# Patient Record
Sex: FEMALE | Race: WHITE | NOT HISPANIC OR LATINO
[De-identification: names, ages, dates, MRNs, and addresses within clinical notes are randomized per-mention and may not be internally consistent; named-entity substitution may affect disease eponyms.]

---

## 2020-08-28 PROBLEM — Z00.00 ENCOUNTER FOR PREVENTIVE HEALTH EXAMINATION: Status: ACTIVE | Noted: 2020-08-28

## 2020-08-30 ENCOUNTER — TRANSCRIPTION ENCOUNTER (OUTPATIENT)
Age: 25
End: 2020-08-30

## 2020-09-01 ENCOUNTER — TRANSCRIPTION ENCOUNTER (OUTPATIENT)
Age: 25
End: 2020-09-01

## 2020-09-02 ENCOUNTER — OUTPATIENT (OUTPATIENT)
Dept: OUTPATIENT SERVICES | Facility: HOSPITAL | Age: 25
LOS: 1 days | Discharge: HOME | End: 2020-09-02

## 2020-09-21 ENCOUNTER — APPOINTMENT (OUTPATIENT)
Dept: PLASTIC SURGERY | Facility: CLINIC | Age: 25
End: 2020-09-21

## 2020-11-01 ENCOUNTER — TRANSCRIPTION ENCOUNTER (OUTPATIENT)
Age: 25
End: 2020-11-01

## 2020-11-02 ENCOUNTER — APPOINTMENT (OUTPATIENT)
Dept: PLASTIC SURGERY | Facility: CLINIC | Age: 25
End: 2020-11-02
Payer: COMMERCIAL

## 2020-11-02 DIAGNOSIS — Z82.49 FAMILY HISTORY OF ISCHEMIC HEART DISEASE AND OTHER DISEASES OF THE CIRCULATORY SYSTEM: ICD-10-CM

## 2020-11-02 PROCEDURE — 99203 OFFICE O/P NEW LOW 30 MIN: CPT | Mod: 95

## 2020-11-02 NOTE — REASON FOR VISIT
[Home] : at home, [unfilled] , at the time of the visit. [Medical Office: ([facility])___] : at the medical office located in  [Verbal consent obtained from patient] : the patient, [unfilled]

## 2020-11-03 ENCOUNTER — TRANSCRIPTION ENCOUNTER (OUTPATIENT)
Age: 25
End: 2020-11-03

## 2020-11-03 PROBLEM — Z82.49 FAMILY HISTORY OF HYPERTENSION: Status: ACTIVE | Noted: 2020-11-03

## 2020-11-03 RX ORDER — ESTRADIOL 10 UG/1
TABLET, FILM COATED VAGINAL
Refills: 0 | Status: ACTIVE | COMMUNITY

## 2020-11-03 RX ORDER — SPIRONOLACTONE 50 MG/1
TABLET ORAL
Refills: 0 | Status: ACTIVE | COMMUNITY

## 2020-11-03 RX ORDER — CITALOPRAM HYDROBROMIDE 10 MG/1
TABLET, FILM COATED ORAL
Refills: 0 | Status: ACTIVE | COMMUNITY

## 2020-11-04 ENCOUNTER — OUTPATIENT (OUTPATIENT)
Dept: OUTPATIENT SERVICES | Facility: HOSPITAL | Age: 25
LOS: 1 days | Discharge: HOME | End: 2020-11-04

## 2020-12-18 ENCOUNTER — NON-APPOINTMENT (OUTPATIENT)
Age: 25
End: 2020-12-18

## 2021-01-12 ENCOUNTER — APPOINTMENT (OUTPATIENT)
Dept: CT IMAGING | Facility: CLINIC | Age: 26
End: 2021-01-12
Payer: COMMERCIAL

## 2021-01-12 ENCOUNTER — OUTPATIENT (OUTPATIENT)
Dept: OUTPATIENT SERVICES | Facility: HOSPITAL | Age: 26
LOS: 1 days | End: 2021-01-12

## 2021-01-12 ENCOUNTER — RESULT REVIEW (OUTPATIENT)
Age: 26
End: 2021-01-12

## 2021-01-12 PROCEDURE — 70486 CT MAXILLOFACIAL W/O DYE: CPT | Mod: 26

## 2021-01-29 ENCOUNTER — APPOINTMENT (OUTPATIENT)
Dept: PLASTIC SURGERY | Facility: CLINIC | Age: 26
End: 2021-01-29
Payer: COMMERCIAL

## 2021-01-29 VITALS
BODY MASS INDEX: 18.61 KG/M2 | HEART RATE: 80 BPM | WEIGHT: 130 LBS | HEIGHT: 70 IN | SYSTOLIC BLOOD PRESSURE: 112 MMHG | DIASTOLIC BLOOD PRESSURE: 79 MMHG

## 2021-01-29 PROCEDURE — 99214 OFFICE O/P EST MOD 30 MIN: CPT

## 2021-01-29 PROCEDURE — 99072 ADDL SUPL MATRL&STAF TM PHE: CPT

## 2021-02-11 ENCOUNTER — APPOINTMENT (OUTPATIENT)
Dept: PLASTIC SURGERY | Facility: CLINIC | Age: 26
End: 2021-02-11
Payer: COMMERCIAL

## 2021-02-11 PROCEDURE — 99072 ADDL SUPL MATRL&STAF TM PHE: CPT

## 2021-02-11 PROCEDURE — 99213 OFFICE O/P EST LOW 20 MIN: CPT

## 2021-02-19 DIAGNOSIS — Z01.818 ENCOUNTER FOR OTHER PREPROCEDURAL EXAMINATION: ICD-10-CM

## 2021-02-20 ENCOUNTER — APPOINTMENT (OUTPATIENT)
Dept: DISASTER EMERGENCY | Facility: CLINIC | Age: 26
End: 2021-02-20

## 2021-02-21 LAB — SARS-COV-2 N GENE NPH QL NAA+PROBE: NOT DETECTED

## 2021-02-21 RX ORDER — OXYCODONE 5 MG/1
5 TABLET ORAL
Qty: 18 | Refills: 0 | Status: ACTIVE | COMMUNITY
Start: 2021-02-21 | End: 1900-01-01

## 2021-02-21 RX ORDER — CHLORHEXIDINE GLUCONATE, 0.12% ORAL RINSE 1.2 MG/ML
0.12 SOLUTION DENTAL
Qty: 1 | Refills: 0 | Status: ACTIVE | COMMUNITY
Start: 2021-02-21 | End: 1900-01-01

## 2021-02-22 ENCOUNTER — TRANSCRIPTION ENCOUNTER (OUTPATIENT)
Age: 26
End: 2021-02-22

## 2021-02-22 VITALS
SYSTOLIC BLOOD PRESSURE: 122 MMHG | DIASTOLIC BLOOD PRESSURE: 85 MMHG | OXYGEN SATURATION: 98 % | HEIGHT: 70 IN | TEMPERATURE: 98 F | HEART RATE: 76 BPM | RESPIRATION RATE: 16 BRPM | WEIGHT: 135.8 LBS

## 2021-02-22 NOTE — ASU PATIENT PROFILE, ADULT - NSSUBSTANCEUSE_GEN_ALL_CORE_SD
occassionally/never used/street drug/inhalant/medication abuse occasionally/street drug/inhalant/medication abuse

## 2021-02-22 NOTE — ASU PATIENT PROFILE, ADULT - VISION (WITH CORRECTIVE LENSES IF THE PATIENT USUALLY WEARS THEM):
Angélica is a 24 year old female here for an obstetrics check.  She is      .  Gestational age: 17w2d      She denies bleeding.  She reports cramping  She reports fetal movement, flutters    Headaches, sometimes daily    Urine SG >1.030, trace protein, trace ketones, small leuks    See Prenatal Flowsheet for additional comments.   glasses/Normal vision: sees adequately in most situations; can see medication labels, newsprint

## 2021-02-22 NOTE — ASU PATIENT PROFILE, ADULT - PSH
H/O breast augmentation  bilat  H/O rhinoplasty    History of surgery  tracheal shave   H/O breast augmentation  bilat  H/O rhinoplasty    History of surgery  tracheal shave  [place] teeth extracted

## 2021-02-23 ENCOUNTER — APPOINTMENT (OUTPATIENT)
Dept: PLASTIC SURGERY | Facility: HOSPITAL | Age: 26
End: 2021-02-23
Payer: COMMERCIAL

## 2021-02-23 ENCOUNTER — INPATIENT (INPATIENT)
Facility: HOSPITAL | Age: 26
LOS: 1 days | Discharge: HOME | DRG: 876 | End: 2021-02-25
Attending: SURGERY | Admitting: SURGERY
Payer: COMMERCIAL

## 2021-02-23 DIAGNOSIS — Z98.890 OTHER SPECIFIED POSTPROCEDURAL STATES: Chronic | ICD-10-CM

## 2021-02-23 DIAGNOSIS — Z98.82 BREAST IMPLANT STATUS: Chronic | ICD-10-CM

## 2021-02-23 DIAGNOSIS — K08.409 PARTIAL LOSS OF TEETH, UNSPECIFIED CAUSE, UNSPECIFIED CLASS: Chronic | ICD-10-CM

## 2021-02-23 PROCEDURE — 21139 RDCTJ FOREHEAD CNTRG&SETBACK: CPT

## 2021-02-23 PROCEDURE — 21122 GENIOP SLDG OSTEOT 2/>: CPT

## 2021-02-23 PROCEDURE — 21256 RECONSTRUCTION OF ORBIT: CPT

## 2021-02-23 PROCEDURE — 21125 AUGMENTATION MNDBLR PROSTC: CPT

## 2021-02-23 PROCEDURE — 15824 RHYTIDECTOMY FOREHEAD: CPT

## 2021-02-23 PROCEDURE — 15876 SUCTION LIPECTOMY HEAD&NECK: CPT

## 2021-02-23 PROCEDURE — 31899 UNLISTED PX TRACHEA BRONCHI: CPT

## 2021-02-23 PROCEDURE — 21209 REDUCTION OF FACIAL BONES: CPT

## 2021-02-23 PROCEDURE — 21127 AUGMENTATION MNDBLR B1 GRF: CPT

## 2021-02-23 RX ORDER — METOCLOPRAMIDE HCL 10 MG
10 TABLET ORAL ONCE
Refills: 0 | Status: COMPLETED | OUTPATIENT
Start: 2021-02-23 | End: 2021-02-23

## 2021-02-23 RX ORDER — OXYCODONE HYDROCHLORIDE 5 MG/1
10 TABLET ORAL EVERY 4 HOURS
Refills: 0 | Status: DISCONTINUED | OUTPATIENT
Start: 2021-02-23 | End: 2021-02-25

## 2021-02-23 RX ORDER — DEXAMETHASONE 0.5 MG/5ML
8 ELIXIR ORAL EVERY 12 HOURS
Refills: 0 | Status: DISCONTINUED | OUTPATIENT
Start: 2021-02-23 | End: 2021-02-25

## 2021-02-23 RX ORDER — OXYCODONE HYDROCHLORIDE 5 MG/1
5 TABLET ORAL EVERY 4 HOURS
Refills: 0 | Status: DISCONTINUED | OUTPATIENT
Start: 2021-02-23 | End: 2021-02-25

## 2021-02-23 RX ORDER — DIPHENHYDRAMINE HCL 50 MG
25 CAPSULE ORAL EVERY 4 HOURS
Refills: 0 | Status: DISCONTINUED | OUTPATIENT
Start: 2021-02-23 | End: 2021-02-25

## 2021-02-23 RX ORDER — SODIUM CHLORIDE 9 MG/ML
1000 INJECTION, SOLUTION INTRAVENOUS
Refills: 0 | Status: DISCONTINUED | OUTPATIENT
Start: 2021-02-23 | End: 2021-02-25

## 2021-02-23 RX ORDER — HYDROMORPHONE HYDROCHLORIDE 2 MG/ML
0.5 INJECTION INTRAMUSCULAR; INTRAVENOUS; SUBCUTANEOUS
Refills: 0 | Status: DISCONTINUED | OUTPATIENT
Start: 2021-02-23 | End: 2021-02-25

## 2021-02-23 RX ORDER — ACETAMINOPHEN 500 MG
1000 TABLET ORAL EVERY 6 HOURS
Refills: 0 | Status: COMPLETED | OUTPATIENT
Start: 2021-02-23 | End: 2021-02-24

## 2021-02-23 RX ORDER — HYDROMORPHONE HYDROCHLORIDE 2 MG/ML
0.5 INJECTION INTRAMUSCULAR; INTRAVENOUS; SUBCUTANEOUS EVERY 4 HOURS
Refills: 0 | Status: DISCONTINUED | OUTPATIENT
Start: 2021-02-23 | End: 2021-02-25

## 2021-02-23 RX ORDER — ACETAMINOPHEN 500 MG
975 TABLET ORAL EVERY 6 HOURS
Refills: 0 | Status: DISCONTINUED | OUTPATIENT
Start: 2021-02-23 | End: 2021-02-25

## 2021-02-23 RX ORDER — ONDANSETRON 8 MG/1
4 TABLET, FILM COATED ORAL EVERY 6 HOURS
Refills: 0 | Status: DISCONTINUED | OUTPATIENT
Start: 2021-02-23 | End: 2021-02-25

## 2021-02-23 RX ADMIN — HYDROMORPHONE HYDROCHLORIDE 0.5 MILLIGRAM(S): 2 INJECTION INTRAMUSCULAR; INTRAVENOUS; SUBCUTANEOUS at 13:54

## 2021-02-23 RX ADMIN — OXYCODONE HYDROCHLORIDE 10 MILLIGRAM(S): 5 TABLET ORAL at 22:21

## 2021-02-23 RX ADMIN — Medication 400 MILLIGRAM(S): at 23:49

## 2021-02-23 RX ADMIN — Medication 100 MILLIGRAM(S): at 18:10

## 2021-02-23 RX ADMIN — Medication 10 MILLIGRAM(S): at 19:13

## 2021-02-23 RX ADMIN — Medication 101.6 MILLIGRAM(S): at 18:50

## 2021-02-23 RX ADMIN — OXYCODONE HYDROCHLORIDE 5 MILLIGRAM(S): 5 TABLET ORAL at 15:24

## 2021-02-23 RX ADMIN — ONDANSETRON 4 MILLIGRAM(S): 8 TABLET, FILM COATED ORAL at 17:24

## 2021-02-23 RX ADMIN — Medication 400 MILLIGRAM(S): at 17:45

## 2021-02-23 RX ADMIN — Medication 100 MILLIGRAM(S): at 21:39

## 2021-02-23 NOTE — H&P ADULT - NSICDXPASTSURGICALHX_GEN_ALL_CORE_FT
PAST SURGICAL HISTORY:  H/O breast augmentation bilat    H/O rhinoplasty     History of surgery tracheal shave    [place] teeth extracted

## 2021-02-23 NOTE — BRIEF OPERATIVE NOTE - NSICDXBRIEFPROCEDURE_GEN_ALL_CORE_FT
PROCEDURES:  Genioplasty 23-Feb-2021 13:04:49  Cody Levin  Exploration, sinus, frontal 23-Feb-2021 13:04:40  Cody Levin

## 2021-02-23 NOTE — H&P ADULT - ASSESSMENT
25 year old female (assigned male at birth) coming in for facial feminization surgery  - NPO for surgery  - Admit to inpatient after surgery

## 2021-02-24 ENCOUNTER — TRANSCRIPTION ENCOUNTER (OUTPATIENT)
Age: 26
End: 2021-02-24

## 2021-02-24 RX ORDER — ESCITALOPRAM OXALATE 10 MG/1
1 TABLET, FILM COATED ORAL
Qty: 0 | Refills: 0 | DISCHARGE
Start: 2021-02-24 | End: 2021-02-26

## 2021-02-24 RX ORDER — ESCITALOPRAM OXALATE 10 MG/1
10 TABLET, FILM COATED ORAL DAILY
Refills: 0 | Status: DISCONTINUED | OUTPATIENT
Start: 2021-02-24 | End: 2021-02-25

## 2021-02-24 RX ADMIN — OXYCODONE HYDROCHLORIDE 10 MILLIGRAM(S): 5 TABLET ORAL at 18:06

## 2021-02-24 RX ADMIN — Medication 100 MILLIGRAM(S): at 14:02

## 2021-02-24 RX ADMIN — OXYCODONE HYDROCHLORIDE 10 MILLIGRAM(S): 5 TABLET ORAL at 09:27

## 2021-02-24 RX ADMIN — ESCITALOPRAM OXALATE 10 MILLIGRAM(S): 10 TABLET, FILM COATED ORAL at 22:03

## 2021-02-24 RX ADMIN — OXYCODONE HYDROCHLORIDE 10 MILLIGRAM(S): 5 TABLET ORAL at 14:06

## 2021-02-24 RX ADMIN — Medication 101.6 MILLIGRAM(S): at 06:37

## 2021-02-24 RX ADMIN — Medication 400 MILLIGRAM(S): at 05:06

## 2021-02-24 RX ADMIN — Medication 100 MILLIGRAM(S): at 22:03

## 2021-02-24 RX ADMIN — OXYCODONE HYDROCHLORIDE 10 MILLIGRAM(S): 5 TABLET ORAL at 22:03

## 2021-02-24 RX ADMIN — Medication 101.6 MILLIGRAM(S): at 18:06

## 2021-02-24 RX ADMIN — Medication 100 MILLIGRAM(S): at 05:41

## 2021-02-24 RX ADMIN — Medication 400 MILLIGRAM(S): at 12:46

## 2021-02-24 NOTE — DISCHARGE NOTE PROVIDER - NSDCFUADDINST_GEN_ALL_CORE_FT
General Discharge Instructions:  Please resume all regular home medications unless specifically advised not to take a particular medication. Also, please take any new medications as prescribed.  Please get plenty of rest, continue to ambulate several times per day, and drink adequate amounts of fluids. Avoid lifting weights greater than 5-10 lbs until you follow-up with your surgeon, who will instruct you further regarding activity restrictions.  Avoid driving or operating heavy machinery while taking pain medications.  Please follow-up with your surgeon and Primary Care Provider (PCP) as advised.  Incision Care:  *Please call your doctor or nurse practitioner if you have increased pain, swelling, redness, or drainage from the incision site.  *Avoid swimming and baths until your follow-up appointment.    Warning Signs:  Please call your doctor or nurse practitioner if you experience the following:  *You experience new chest pain, pressure, squeezing or tightness.  *New or worsening cough, shortness of breath, or wheeze.  *If you are vomiting and cannot keep down fluids or your medications.  *You are getting dehydrated due to continued vomiting, diarrhea, or other reasons. Signs of dehydration include dry mouth, rapid heartbeat, or feeling dizzy or faint when standing.  *You experience burning when you urinate, have blood in your urine, or experience a discharge.  *Your pain is not improving within 8-12 hours or is not gone within 24 hours.  *You have shaking chills, or fever greater than 101.5 degrees Fahrenheit or 38 degrees Celsius.  *Any change in your symptoms, or any new symptoms that concern you.

## 2021-02-24 NOTE — DISCHARGE NOTE PROVIDER - CARE PROVIDER_API CALL
Edgar Deleon (MD)  Plastic Surgery; Surgery  [address], [address]  [place] [address]  Phone: [phone]  Fax: [phone]  Follow Up Time:

## 2021-02-24 NOTE — DISCHARGE NOTE PROVIDER - HOSPITAL COURSE
24 yo F underwent facial feminization surgery (forehead and chin) for gender dysphoria. Patient tolerated the procedure well and had uneventful postoperative hospital course.  On the day of the discharge, patient was evaluated and deemed in stable condition to be discharged to home. Follow up in one week with Dr. Deleon in the office.

## 2021-02-24 NOTE — DISCHARGE NOTE PROVIDER - NSDCFUADDAPPT_GEN_ALL_CORE_FT
Please call Dr. Deleon's office to make an appointment in one week.    Prescriptions were sent from office to home pharmacy - use as directed in instructions.

## 2021-02-24 NOTE — DISCHARGE NOTE PROVIDER - NSDCCPCAREPLAN_GEN_ALL_CORE_FT
PRINCIPAL DISCHARGE DIAGNOSIS  Diagnosis: Gender dysphoria  Assessment and Plan of Treatment: ryne refer to the post-operative care instructions provided from  Pancho's office.   -Follow up with Plastic Surgeon,  Pancho in 1 week in the office.      -Take Percocet as prescribed for pain control.     -Diet: soft     -No heavy lifting >20 pounds or strenuous exercises.  -Call Doctor’s office or return to ER if: fever (temperature >101.4F), chills, chest pain, shortness of breath, uncontrolled/severe pain, persistent nausea/vomiting, or bleeding/oozing/redness/swelling at incision sites.

## 2021-02-25 ENCOUNTER — TRANSCRIPTION ENCOUNTER (OUTPATIENT)
Age: 26
End: 2021-02-25

## 2021-02-25 VITALS
SYSTOLIC BLOOD PRESSURE: 107 MMHG | DIASTOLIC BLOOD PRESSURE: 72 MMHG | RESPIRATION RATE: 18 BRPM | TEMPERATURE: 98 F | HEART RATE: 85 BPM | OXYGEN SATURATION: 97 %

## 2021-02-25 PROCEDURE — 86850 RBC ANTIBODY SCREEN: CPT

## 2021-02-25 PROCEDURE — 86900 BLOOD TYPING SEROLOGIC ABO: CPT

## 2021-02-25 PROCEDURE — C1713: CPT

## 2021-02-25 PROCEDURE — C1889: CPT

## 2021-02-25 PROCEDURE — C9399: CPT

## 2021-02-25 PROCEDURE — 86901 BLOOD TYPING SEROLOGIC RH(D): CPT

## 2021-02-25 RX ADMIN — OXYCODONE HYDROCHLORIDE 5 MILLIGRAM(S): 5 TABLET ORAL at 05:30

## 2021-02-25 RX ADMIN — OXYCODONE HYDROCHLORIDE 5 MILLIGRAM(S): 5 TABLET ORAL at 11:36

## 2021-02-25 RX ADMIN — Medication 101.6 MILLIGRAM(S): at 06:10

## 2021-02-25 RX ADMIN — Medication 100 MILLIGRAM(S): at 06:10

## 2021-02-25 NOTE — PROGRESS NOTE ADULT - SUBJECTIVE AND OBJECTIVE BOX
25F s/p facial feminization procedure on 2/23.    SUBJECTIVE: No acute overnight events. Pain controlled. No other concerns. Dressing was removed at bedside and new facebra was applied. LON removed.    PAST MEDICAL & SURGICAL HISTORY:  Transgender  male to female    [place] teeth extracted    History of surgery  tracheal shave    H/O rhinoplasty    H/O breast augmentation  bilat      penicillin (Rash)  TOBACCO (Short breath)  Tree Nuts (Swelling; Rash; Diarrhea)    MEDICATIONS  (STANDING):  acetaminophen   Tablet .. 975 milliGRAM(s) Oral every 6 hours  clindamycin IVPB 900 milliGRAM(s) IV Intermittent every 8 hours  dexAMETHasone  IVPB 8 milliGRAM(s) IV Intermittent every 12 hours  lactated ringers. 1000 milliLiter(s) (75 mL/Hr) IV Continuous <Continuous>    MEDICATIONS  (PRN):  aluminum hydroxide/magnesium hydroxide/simethicone Suspension 30 milliLiter(s) Oral every 4 hours PRN Dyspepsia  diphenhydrAMINE 25 milliGRAM(s) Oral every 4 hours PRN Itching  HYDROmorphone  Injectable 0.5 milliGRAM(s) IV Push every 4 hours PRN Breakthrough pain  HYDROmorphone  Injectable 0.5 milliGRAM(s) IV Push every 15 minutes PRN pacu pain  ondansetron Injectable 4 milliGRAM(s) IV Push every 6 hours PRN Nausea and/or Vomiting  oxyCODONE    IR 5 milliGRAM(s) Oral every 4 hours PRN Moderate Pain (4 - 6)  oxyCODONE    IR 10 milliGRAM(s) Oral every 4 hours PRN Severe Pain (7 - 10)      Objective    ICU Vital Signs Last 24 Hrs  T(C): 36.7 (25 Feb 2021 05:42), Max: 36.8 (24 Feb 2021 20:25)  T(F): 98 (25 Feb 2021 05:42), Max: 98.3 (24 Feb 2021 20:25)  HR: 88 (25 Feb 2021 05:42) (86 - 101)  BP: 118/73 (25 Feb 2021 05:42) (111/69 - 119/74)  BP(mean): --  ABP: --  ABP(mean): --  RR: 19 (25 Feb 2021 05:42) (17 - 19)  SpO2: 99% (25 Feb 2021 05:42) (98% - 100%)        PHYSICAL EXAM:    CONSTITUTIONAL: Well nourished, well developed, NON-DYSMORPHIC, and in no acute distress.    HEAD: Pressure dressing in place with LON x1 ss. Facial bra in place.  RESPIRATORY: Respirations unlabored, no increased work of breathing with use of accessory muscles and retractions. No stridor.  CARDIAC: Warm extremities, no cyanosis.               I&O's Summary    23 Feb 2021 07:01  -  24 Feb 2021 07:00  --------------------------------------------------------  IN: 1530 mL / OUT: 2012.5 mL / NET: -482.5 mL    24 Feb 2021 07:01  -  24 Feb 2021 14:20  --------------------------------------------------------  IN: 375 mL / OUT: 1010 mL / NET: -635 mL      
25F s/p facial feminization procedure on 2/23.    SUBJECTIVE: No acute overnight events. Patient had vomiting and nausea that improved with zofran and decadron. LON drain not suctioning, so the entry site was packed with xeroform. Pain controlled. No other concerns.    PAST MEDICAL & SURGICAL HISTORY:  Transgender  male to female    [place] teeth extracted    History of surgery  tracheal shave    H/O rhinoplasty    H/O breast augmentation  bilat      penicillin (Rash)  TOBACCO (Short breath)  Tree Nuts (Swelling; Rash; Diarrhea)    MEDICATIONS  (STANDING):  acetaminophen   Tablet .. 975 milliGRAM(s) Oral every 6 hours  clindamycin IVPB 900 milliGRAM(s) IV Intermittent every 8 hours  dexAMETHasone  IVPB 8 milliGRAM(s) IV Intermittent every 12 hours  lactated ringers. 1000 milliLiter(s) (75 mL/Hr) IV Continuous <Continuous>    MEDICATIONS  (PRN):  aluminum hydroxide/magnesium hydroxide/simethicone Suspension 30 milliLiter(s) Oral every 4 hours PRN Dyspepsia  diphenhydrAMINE 25 milliGRAM(s) Oral every 4 hours PRN Itching  HYDROmorphone  Injectable 0.5 milliGRAM(s) IV Push every 4 hours PRN Breakthrough pain  HYDROmorphone  Injectable 0.5 milliGRAM(s) IV Push every 15 minutes PRN pacu pain  ondansetron Injectable 4 milliGRAM(s) IV Push every 6 hours PRN Nausea and/or Vomiting  oxyCODONE    IR 5 milliGRAM(s) Oral every 4 hours PRN Moderate Pain (4 - 6)  oxyCODONE    IR 10 milliGRAM(s) Oral every 4 hours PRN Severe Pain (7 - 10)      Objective    ICU Vital Signs Last 24 Hrs  T(C): 36.7 (24 Feb 2021 09:52), Max: 37.2 (24 Feb 2021 05:08)  T(F): 98.1 (24 Feb 2021 09:52), Max: 98.9 (24 Feb 2021 05:08)  HR: 86 (24 Feb 2021 09:52) (62 - 86)  BP: 112/69 (24 Feb 2021 09:52) (104/65 - 133/68)  BP(mean): 95 (23 Feb 2021 14:26) (95 - 95)  ABP: --  ABP(mean): --  RR: 17 (24 Feb 2021 09:52) (16 - 35)  SpO2: 99% (24 Feb 2021 09:52) (98% - 100%)      PHYSICAL EXAM:    CONSTITUTIONAL: Well nourished, well developed, NON-DYSMORPHIC, and in no acute distress.    HEAD: Pressure dressing in place with LON x1 ss. Mild swelling of submental area.   RESPIRATORY: Respirations unlabored, no increased work of breathing with use of accessory muscles and retractions. No stridor.  CARDIAC: Warm extremities, no cyanosis.               I&O's Summary    23 Feb 2021 07:01  -  24 Feb 2021 07:00  --------------------------------------------------------  IN: 1530 mL / OUT: 2012.5 mL / NET: -482.5 mL    24 Feb 2021 07:01  -  24 Feb 2021 14:20  --------------------------------------------------------  IN: 375 mL / OUT: 1010 mL / NET: -635 mL      
temporal

## 2021-02-25 NOTE — DISCHARGE NOTE NURSING/CASE MANAGEMENT/SOCIAL WORK - PATIENT PORTAL LINK FT
You can access the FollowMyHealth Patient Portal offered by [facility] by registering at the following website: [email or web address]. By joining [employer]’s FollowMyHealth portal, you will also be able to view your health information using other applications (apps) compatible with our system.

## 2021-02-25 NOTE — PROGRESS NOTE ADULT - ASSESSMENT
25F s/p facial feminization procedure on 2/23, adequately recovering.    - Pain/nausea PRN medications  - Soft diet  - LON care  - Clindamycin  - SCDs
25F s/p facial feminization procedure on 2/23, adequately recovering.    - Discharge today  - Pain/nausea PRN medications  - Soft diet  - Clindamycin  - SCDs

## 2021-03-02 PROBLEM — F64.0 TRANSSEXUALISM: Chronic | Status: ACTIVE | Noted: 2021-02-22

## 2021-03-04 DIAGNOSIS — F64.9 GENDER IDENTITY DISORDER, UNSPECIFIED: ICD-10-CM

## 2021-03-04 DIAGNOSIS — Z87.890 PERSONAL HISTORY OF SEX REASSIGNMENT: ICD-10-CM

## 2021-03-04 DIAGNOSIS — Z88.8 ALLERGY STATUS TO OTHER DRUGS, MEDICAMENTS AND BIOLOGICAL SUBSTANCES STATUS: ICD-10-CM

## 2021-03-04 DIAGNOSIS — Z88.0 ALLERGY STATUS TO PENICILLIN: ICD-10-CM

## 2021-03-04 DIAGNOSIS — Z91.018 ALLERGY TO OTHER FOODS: ICD-10-CM

## 2021-03-05 ENCOUNTER — APPOINTMENT (OUTPATIENT)
Dept: PLASTIC SURGERY | Facility: CLINIC | Age: 26
End: 2021-03-05

## 2021-03-10 NOTE — HISTORY OF PRESENT ILLNESS
[FreeTextEntry1] : NINA is a 25 year old transgender female patient that presents to the office today for a post operative evaluation s/p Facial  feminization surgery (Frontal sinus setback, Bilateral orbital reconstruction, Bilateral browlift, Bilateral supraorbital contouring, Osseous genioplasty narrowing, shortening, advancement, Mandible  reconstruction with bone grafting, Revision tracheal shave, Reconstruction and contouring of mandibular region and jaw, Submental fat excision, platysmaplasty, tightening and finally bilateral tarsorrhaphy) on 2/23/2021. Patient denies having any significant concerns or complaints.\par

## 2021-03-10 NOTE — PHYSICAL EXAM
[de-identified] : Alert, calm, cooperative. [de-identified] : Coronal incision is well approximated with no signs of wound dehiscence or fluctuance. Moderate edema and mild ecchymosis noted.\par  [de-identified] : Oral incision is well approximated with no signs of wound dehiscence or fluctuance. Moderate edema and mild ecchymosis noted.\par  [de-identified] : Tracheal shave incision is well approximated with no signs of wound dehiscence or fluctuance. Moderate edema and mild ecchymosis noted.\par  [de-identified] : Respirations even and unlabored.\par

## 2021-03-19 ENCOUNTER — APPOINTMENT (OUTPATIENT)
Dept: PLASTIC SURGERY | Facility: CLINIC | Age: 26
End: 2021-03-19
Payer: COMMERCIAL

## 2021-03-19 PROCEDURE — 99024 POSTOP FOLLOW-UP VISIT: CPT

## 2021-04-16 ENCOUNTER — APPOINTMENT (OUTPATIENT)
Dept: PLASTIC SURGERY | Facility: CLINIC | Age: 26
End: 2021-04-16

## 2021-04-22 NOTE — HISTORY OF PRESENT ILLNESS
[FreeTextEntry1] : NINA is a 25 year old transgender female patient that presents to the office today for a post operative evaluation s/p Facial  feminization surgery (Frontal sinus setback, Bilateral orbital reconstruction, Bilateral browlift, Bilateral supraorbital contouring, Osseous genioplasty narrowing, shortening, advancement, Mandible  reconstruction with bone grafting, Revision tracheal shave, Reconstruction and contouring of mandibular region and jaw, Submental fat excision, platysmaplasty, tightening and finally bilateral tarsorrhaphy) on 2/23/2021. Patient continues to have concerns about lower mandibular angle border feeling irregular. Patient denies having any other significant concerns or complaints.\par

## 2021-04-22 NOTE — PHYSICAL EXAM
[de-identified] : Coronal incision with no signs of wound dehiscence or fluctuance. Mild edema but improving.\par  [de-identified] : Alert, calm, cooperative. [de-identified] : Oral incision with no signs of wound dehiscence or fluctuance. Mild edema.\par  [de-identified] : Tracheal shave incision with no signs of wound dehiscence or fluctuance. Mild edema but improving.\par  [de-identified] : Respirations even and unlabored.\par

## 2021-05-28 ENCOUNTER — APPOINTMENT (OUTPATIENT)
Dept: PLASTIC SURGERY | Facility: CLINIC | Age: 26
End: 2021-05-28
Payer: COMMERCIAL

## 2021-05-28 PROCEDURE — 99072 ADDL SUPL MATRL&STAF TM PHE: CPT

## 2021-05-28 PROCEDURE — 99212 OFFICE O/P EST SF 10 MIN: CPT

## 2021-06-04 ENCOUNTER — APPOINTMENT (OUTPATIENT)
Dept: PLASTIC SURGERY | Facility: CLINIC | Age: 26
End: 2021-06-04

## 2021-07-26 ENCOUNTER — TRANSCRIPTION ENCOUNTER (OUTPATIENT)
Age: 26
End: 2021-07-26

## 2021-07-26 LAB
BASOPHILS # BLD AUTO: 0.02 K/UL
BASOPHILS NFR BLD AUTO: 0.4 %
EOSINOPHIL # BLD AUTO: 0.1 K/UL
EOSINOPHIL NFR BLD AUTO: 2.2 %
HCT VFR BLD CALC: 39 %
HGB BLD-MCNC: 13.7 G/DL
IMM GRANULOCYTES NFR BLD AUTO: 0.2 %
LYMPHOCYTES # BLD AUTO: 1.45 K/UL
LYMPHOCYTES NFR BLD AUTO: 31.8 %
MAN DIFF?: NORMAL
MCHC RBC-ENTMCNC: 33.5 PG
MCHC RBC-ENTMCNC: 35.1 GM/DL
MCV RBC AUTO: 95.4 FL
MONOCYTES # BLD AUTO: 0.5 K/UL
MONOCYTES NFR BLD AUTO: 11 %
NEUTROPHILS # BLD AUTO: 2.48 K/UL
NEUTROPHILS NFR BLD AUTO: 54.4 %
PLATELET # BLD AUTO: 233 K/UL
RBC # BLD: 4.09 M/UL
RBC # FLD: 11.7 %
WBC # FLD AUTO: 4.56 K/UL

## 2021-07-27 RX ORDER — OXYCODONE 5 MG/1
5 TABLET ORAL
Qty: 12 | Refills: 0 | Status: ACTIVE | COMMUNITY
Start: 2021-07-27 | End: 1900-01-01

## 2021-07-27 RX ORDER — CHLORHEXIDINE GLUCONATE, 0.12% ORAL RINSE 1.2 MG/ML
0.12 SOLUTION DENTAL
Qty: 1 | Refills: 0 | Status: ACTIVE | COMMUNITY
Start: 2021-07-27 | End: 1900-01-01

## 2021-07-28 ENCOUNTER — TRANSCRIPTION ENCOUNTER (OUTPATIENT)
Age: 26
End: 2021-07-28

## 2021-07-28 VITALS
DIASTOLIC BLOOD PRESSURE: 63 MMHG | HEART RATE: 75 BPM | WEIGHT: 138.89 LBS | TEMPERATURE: 97 F | RESPIRATION RATE: 16 BRPM | HEIGHT: 70 IN | OXYGEN SATURATION: 98 % | SYSTOLIC BLOOD PRESSURE: 104 MMHG

## 2021-07-28 NOTE — ASU PATIENT PROFILE, ADULT - PSH
Elective surgery  facial feminization  H/O breast augmentation  bilat  H/O rhinoplasty    History of surgery  tracheal shave  [place] teeth extracted

## 2021-07-29 ENCOUNTER — OUTPATIENT (OUTPATIENT)
Dept: OUTPATIENT SERVICES | Facility: HOSPITAL | Age: 26
LOS: 1 days | Discharge: HOME | End: 2021-07-29
Payer: MEDICAID

## 2021-07-29 ENCOUNTER — APPOINTMENT (OUTPATIENT)
Dept: PLASTIC SURGERY | Facility: HOSPITAL | Age: 26
End: 2021-07-29
Payer: COMMERCIAL

## 2021-07-29 VITALS
RESPIRATION RATE: 14 BRPM | OXYGEN SATURATION: 96 % | HEART RATE: 96 BPM | TEMPERATURE: 97 F | SYSTOLIC BLOOD PRESSURE: 111 MMHG | DIASTOLIC BLOOD PRESSURE: 72 MMHG

## 2021-07-29 DIAGNOSIS — Z41.9 ENCOUNTER FOR PROCEDURE FOR PURPOSES OTHER THAN REMEDYING HEALTH STATE, UNSPECIFIED: Chronic | ICD-10-CM

## 2021-07-29 DIAGNOSIS — Z98.890 OTHER SPECIFIED POSTPROCEDURAL STATES: Chronic | ICD-10-CM

## 2021-07-29 DIAGNOSIS — Z98.82 BREAST IMPLANT STATUS: Chronic | ICD-10-CM

## 2021-07-29 DIAGNOSIS — K08.409 PARTIAL LOSS OF TEETH, UNSPECIFIED CAUSE, UNSPECIFIED CLASS: Chronic | ICD-10-CM

## 2021-07-29 PROCEDURE — 40525 RECONSTRUCT LIP WITH FLAP: CPT

## 2021-07-29 PROCEDURE — 15773 GRFG AUTOL FAT LIPO 25 CC/<: CPT

## 2021-07-29 PROCEDURE — 40799 UNLISTED PROCEDURE LIPS: CPT

## 2021-07-29 RX ORDER — HYDROMORPHONE HYDROCHLORIDE 2 MG/ML
0.5 INJECTION INTRAMUSCULAR; INTRAVENOUS; SUBCUTANEOUS
Refills: 0 | Status: DISCONTINUED | OUTPATIENT
Start: 2021-07-29 | End: 2021-07-29

## 2021-07-29 RX ORDER — SCOPALAMINE 1 MG/3D
1 PATCH, EXTENDED RELEASE TRANSDERMAL ONCE
Refills: 0 | Status: COMPLETED | OUTPATIENT
Start: 2021-07-29 | End: 2021-07-29

## 2021-07-29 RX ORDER — ACETAMINOPHEN 500 MG
975 TABLET ORAL EVERY 6 HOURS
Refills: 0 | Status: DISCONTINUED | OUTPATIENT
Start: 2021-07-29 | End: 2021-07-29

## 2021-07-29 RX ORDER — SODIUM CHLORIDE 9 MG/ML
1000 INJECTION, SOLUTION INTRAVENOUS
Refills: 0 | Status: DISCONTINUED | OUTPATIENT
Start: 2021-07-29 | End: 2021-07-29

## 2021-07-29 RX ORDER — ACETAMINOPHEN 500 MG
1000 TABLET ORAL EVERY 8 HOURS
Refills: 0 | Status: DISCONTINUED | OUTPATIENT
Start: 2021-07-29 | End: 2021-07-29

## 2021-07-29 RX ADMIN — SCOPALAMINE 1 PATCH: 1 PATCH, EXTENDED RELEASE TRANSDERMAL at 11:29

## 2021-07-29 NOTE — PACU DISCHARGE NOTE - COMMENTS
Discharge criteria met and patient discharged by Anesthesia. Discharge instructions given to patient and patient verbalized understanding. IV removed and catheter intact.

## 2021-07-29 NOTE — BRIEF OPERATIVE NOTE - ANESTHESIOLOGIST NAME
Dr. Kent Include Pregnancy/Lactation Warning?: No Topical Sulfur Applications Pregnancy And Lactation Text: This medication is Pregnancy Category C and has an unknown safety profile during pregnancy. It is unknown if this topical medication is excreted in breast milk. High Dose Vitamin A Pregnancy And Lactation Text: High dose vitamin A therapy is contraindicated during pregnancy and breast feeding. Benzoyl Peroxide Counseling: Patient counseled that medicine may cause skin irritation and bleach clothing.  In the event of skin irritation, the patient was advised to reduce the amount of the drug applied or use it less frequently.   The patient verbalized understanding of the proper use and possible adverse effects of benzoyl peroxide.  All of the patient's questions and concerns were addressed. Birth Control Pills Counseling: Birth Control Pill Counseling: I discussed with the patient the potential side effects of OCPs including but not limited to increased risk of stroke, heart attack, thrombophlebitis, deep venous thrombosis, hepatic adenomas, breast changes, GI upset, headaches, and depression.  The patient verbalized understanding of the proper use and possible adverse effects of OCPs. All of the patient's questions and concerns were addressed. Tazorac Counseling:  Patient advised that medication is irritating and drying.  Patient may need to apply sparingly and wash off after an hour before eventually leaving it on overnight.  The patient verbalized understanding of the proper use and possible adverse effects of tazorac.  All of the patient's questions and concerns were addressed. Spironolactone Counseling: Patient advised regarding risks of diarrhea, abdominal pain, hyperkalemia, birth defects (for female patients), liver toxicity and renal toxicity. The patient may need blood work to monitor liver and kidney function and potassium levels while on therapy. The patient verbalized understanding of the proper use and possible adverse effects of spironolactone.  All of the patient's questions and concerns were addressed. Erythromycin Pregnancy And Lactation Text: This medication is Pregnancy Category B and is considered safe during pregnancy. It is also excreted in breast milk. Doxycycline Counseling:  Patient counseled regarding possible photosensitivity and increased risk for sunburn.  Patient instructed to avoid sunlight, if possible.  When exposed to sunlight, patients should wear protective clothing, sunglasses, and sunscreen.  The patient was instructed to call the office immediately if the following severe adverse effects occur:  hearing changes, easy bruising/bleeding, severe headache, or vision changes.  The patient verbalized understanding of the proper use and possible adverse effects of doxycycline.  All of the patient's questions and concerns were addressed. Detail Level: Simple Doxycycline Pregnancy And Lactation Text: This medication is Pregnancy Category D and not consider safe during pregnancy. It is also excreted in breast milk but is considered safe for shorter treatment courses. Bactrim Pregnancy And Lactation Text: This medication is Pregnancy Category D and is known to cause fetal risk.  It is also excreted in breast milk. Bactrim Counseling:  I discussed with the patient the risks of sulfa antibiotics including but not limited to GI upset, allergic reaction, drug rash, diarrhea, dizziness, photosensitivity, and yeast infections.  Rarely, more serious reactions can occur including but not limited to aplastic anemia, agranulocytosis, methemoglobinemia, blood dyscrasias, liver or kidney failure, lung infiltrates or desquamative/blistering drug rashes. Dapsone Pregnancy And Lactation Text: This medication is Pregnancy Category C and is not considered safe during pregnancy or breast feeding. Azithromycin Pregnancy And Lactation Text: This medication is considered safe during pregnancy and is also secreted in breast milk. Topical Clindamycin Counseling: Patient counseled that this medication may cause skin irritation or allergic reactions.  In the event of skin irritation, the patient was advised to reduce the amount of the drug applied or use it less frequently.   The patient verbalized understanding of the proper use and possible adverse effects of clindamycin.  All of the patient's questions and concerns were addressed. Isotretinoin Pregnancy And Lactation Text: This medication is Pregnancy Category X and is considered extremely dangerous during pregnancy. It is unknown if it is excreted in breast milk. Detail Level: Generalized Erythromycin Counseling:  I discussed with the patient the risks of erythromycin including but not limited to GI upset, allergic reaction, drug rash, diarrhea, increase in liver enzymes, and yeast infections. Birth Control Pills Pregnancy And Lactation Text: This medication should be avoided if pregnant and for the first 30 days post-partum. Sarecycline Counseling: Patient advised regarding possible photosensitivity and discoloration of the teeth, skin, lips, tongue and gums.  Patient instructed to avoid sunlight, if possible.  When exposed to sunlight, patients should wear protective clothing, sunglasses, and sunscreen.  The patient was instructed to call the office immediately if the following severe adverse effects occur:  hearing changes, easy bruising/bleeding, severe headache, or vision changes.  The patient verbalized understanding of the proper use and possible adverse effects of sarecycline.  All of the patient's questions and concerns were addressed. Sarecycline Pregnancy And Lactation Text: This medication is Pregnancy Category D and not consider safe during pregnancy. It is also excreted in breast milk. Detail Level: Zone Topical Sulfur Applications Counseling: Topical Sulfur Counseling: Patient counseled that this medication may cause skin irritation or allergic reactions.  In the event of skin irritation, the patient was advised to reduce the amount of the drug applied or use it less frequently.   The patient verbalized understanding of the proper use and possible adverse effects of topical sulfur application.  All of the patient's questions and concerns were addressed. Tetracycline Counseling: Patient counseled regarding possible photosensitivity and increased risk for sunburn.  Patient instructed to avoid sunlight, if possible.  When exposed to sunlight, patients should wear protective clothing, sunglasses, and sunscreen.  The patient was instructed to call the office immediately if the following severe adverse effects occur:  hearing changes, easy bruising/bleeding, severe headache, or vision changes.  The patient verbalized understanding of the proper use and possible adverse effects of tetracycline.  All of the patient's questions and concerns were addressed. Patient understands to avoid pregnancy while on therapy due to potential birth defects. Topical Retinoid counseling:  Patient advised to apply a pea-sized amount only at bedtime and wait 30 minutes after washing their face before applying.  If too drying, patient may add a non-comedogenic moisturizer. The patient verbalized understanding of the proper use and possible adverse effects of retinoids.  All of the patient's questions and concerns were addressed. Topical Retinoid Pregnancy And Lactation Text: This medication is Pregnancy Category C. It is unknown if this medication is excreted in breast milk. Spironolactone Pregnancy And Lactation Text: This medication can cause feminization of the male fetus and should be avoided during pregnancy. The active metabolite is also found in breast milk. Topical Clindamycin Pregnancy And Lactation Text: This medication is Pregnancy Category B and is considered safe during pregnancy. It is unknown if it is excreted in breast milk. Azithromycin Counseling:  I discussed with the patient the risks of azithromycin including but not limited to GI upset, allergic reaction, drug rash, diarrhea, and yeast infections. Minocycline Counseling: Patient advised regarding possible photosensitivity and discoloration of the teeth, skin, lips, tongue and gums.  Patient instructed to avoid sunlight, if possible.  When exposed to sunlight, patients should wear protective clothing, sunglasses, and sunscreen.  The patient was instructed to call the office immediately if the following severe adverse effects occur:  hearing changes, easy bruising/bleeding, severe headache, or vision changes.  The patient verbalized understanding of the proper use and possible adverse effects of minocycline.  All of the patient's questions and concerns were addressed. Benzoyl Peroxide Pregnancy And Lactation Text: This medication is Pregnancy Category C. It is unknown if benzoyl peroxide is excreted in breast milk. Dapsone Counseling: I discussed with the patient the risks of dapsone including but not limited to hemolytic anemia, agranulocytosis, rashes, methemoglobinemia, kidney failure, peripheral neuropathy, headaches, GI upset, and liver toxicity.  Patients who start dapsone require monitoring including baseline LFTs and weekly CBCs for the first month, then every month thereafter.  The patient verbalized understanding of the proper use and possible adverse effects of dapsone.  All of the patient's questions and concerns were addressed. Isotretinoin Counseling: Patient should get monthly blood tests, not donate blood, not drive at night if vision affected, not share medication, and not undergo elective surgery for 6 months after tx completed. Side effects reviewed, pt to contact office should one occur. High Dose Vitamin A Counseling: Side effects reviewed, pt to contact office should one occur. Tazorac Pregnancy And Lactation Text: This medication is not safe during pregnancy. It is unknown if this medication is excreted in breast milk.

## 2021-07-29 NOTE — BRIEF OPERATIVE NOTE - NSICDXBRIEFPROCEDURE_GEN_ALL_CORE_FT
PROCEDURES:  Lip lift 29-Jul-2021 14:17:21  Precious Miramontes  Injection, fat graft, face 29-Jul-2021 14:17:30  Precious Miramontes  Excision, excess skin 29-Jul-2021 14:17:48  Precious Miramontes

## 2021-08-04 PROBLEM — F41.9 ANXIETY DISORDER, UNSPECIFIED: Chronic | Status: ACTIVE | Noted: 2021-07-28

## 2021-08-06 ENCOUNTER — APPOINTMENT (OUTPATIENT)
Dept: PLASTIC SURGERY | Facility: CLINIC | Age: 26
End: 2021-08-06

## 2021-08-08 NOTE — PHYSICAL EXAM
[de-identified] : Alert, calm, cooperative. [de-identified] : \par  [de-identified] : Upper lip lift incision is well approximated with no signs of wound dehiscence or fluctuance. Moderate edema and mild ecchymosis noted.\par  [de-identified] : \par  [de-identified] : Respirations even and unlabored.\par

## 2021-08-08 NOTE — HISTORY OF PRESENT ILLNESS
[FreeTextEntry1] : NINA is a 26 year old transgender female patient that presents to the office today for a post operative evaluation s/p Upper lip lift, Fat grafting to malar and nasolabial fold, lower eyelid regions on 7/29/2021. Patient is happy with the results so far. Patient denies having any significant concerns or complaints.\par

## 2021-08-13 ENCOUNTER — APPOINTMENT (OUTPATIENT)
Dept: PLASTIC SURGERY | Facility: CLINIC | Age: 26
End: 2021-08-13

## 2021-08-13 ENCOUNTER — NON-APPOINTMENT (OUTPATIENT)
Age: 26
End: 2021-08-13

## 2021-08-15 NOTE — PHYSICAL EXAM
[de-identified] : Alert, calm, cooperative. [de-identified] : \par  [de-identified] : Upper lip lift incision with no signs of wound dehiscence or fluctuance. Mild edema noted but improving.\par  [de-identified] : \par  [de-identified] : Respirations even and unlabored.\par

## 2021-09-03 ENCOUNTER — APPOINTMENT (OUTPATIENT)
Dept: PLASTIC SURGERY | Facility: CLINIC | Age: 26
End: 2021-09-03

## 2021-12-17 ENCOUNTER — APPOINTMENT (OUTPATIENT)
Dept: PLASTIC SURGERY | Facility: CLINIC | Age: 26
End: 2021-12-17

## 2022-01-28 ENCOUNTER — APPOINTMENT (OUTPATIENT)
Dept: PLASTIC SURGERY | Facility: CLINIC | Age: 27
End: 2022-01-28
Payer: COMMERCIAL

## 2022-01-28 PROCEDURE — 99212 OFFICE O/P EST SF 10 MIN: CPT

## 2022-01-28 PROCEDURE — 99072 ADDL SUPL MATRL&STAF TM PHE: CPT

## 2022-02-11 ENCOUNTER — APPOINTMENT (OUTPATIENT)
Dept: PLASTIC SURGERY | Facility: CLINIC | Age: 27
End: 2022-02-11
Payer: COMMERCIAL

## 2022-02-11 VITALS
DIASTOLIC BLOOD PRESSURE: 74 MMHG | HEIGHT: 70 IN | WEIGHT: 133 LBS | SYSTOLIC BLOOD PRESSURE: 116 MMHG | OXYGEN SATURATION: 99 % | HEART RATE: 83 BPM | BODY MASS INDEX: 19.04 KG/M2 | TEMPERATURE: 97.8 F

## 2022-02-11 PROCEDURE — 99212 OFFICE O/P EST SF 10 MIN: CPT

## 2022-02-11 PROCEDURE — 99072 ADDL SUPL MATRL&STAF TM PHE: CPT

## 2022-08-04 ENCOUNTER — APPOINTMENT (OUTPATIENT)
Dept: PLASTIC SURGERY | Facility: CLINIC | Age: 27
End: 2022-08-04

## 2022-08-04 VITALS
DIASTOLIC BLOOD PRESSURE: 71 MMHG | WEIGHT: 130 LBS | RESPIRATION RATE: 17 BRPM | OXYGEN SATURATION: 99 % | BODY MASS INDEX: 18.61 KG/M2 | HEART RATE: 73 BPM | HEIGHT: 70 IN | TEMPERATURE: 97.2 F | SYSTOLIC BLOOD PRESSURE: 106 MMHG

## 2022-08-04 DIAGNOSIS — Z86.59 PERSONAL HISTORY OF OTHER MENTAL AND BEHAVIORAL DISORDERS: ICD-10-CM

## 2022-08-04 DIAGNOSIS — Z78.9 OTHER SPECIFIED HEALTH STATUS: ICD-10-CM

## 2022-08-04 PROCEDURE — 99072 ADDL SUPL MATRL&STAF TM PHE: CPT

## 2022-08-04 PROCEDURE — 99214 OFFICE O/P EST MOD 30 MIN: CPT

## 2022-08-07 PROBLEM — Z86.59 HISTORY OF ANXIETY: Status: RESOLVED | Noted: 2022-08-04 | Resolved: 2022-08-07

## 2022-08-07 PROBLEM — Z78.9 NON-SMOKER: Status: ACTIVE | Noted: 2022-08-04

## 2022-08-07 NOTE — PHYSICAL EXAM
[de-identified] : NAD.  BMI 18.7 [de-identified] : Breast exam was performed with a medical assistant chaperone present. No dominant masses, skin changes, nipple retraction, or palpable axillary lymphadenopathy. SN->N distance is 21.5 cm on the right and 21 cm on the left; width is 13.5 cm on the right and 13 cm on the left; N->IMF is 6.5 cm on the right and 8 cm on the left. There is no ptosis.  His nipples are relatively widely spaced.  The upper poles of the implants are visible.  The right implant is positioned slightly higher on the chest.  Breasts are soft.  No clear evidence of capsular contracture.

## 2022-08-07 NOTE — HISTORY OF PRESENT ILLNESS
[FreeTextEntry1] : 27-year-old woman designated male at birth (Gissell; she/her) presents for consultation for revision of breast augmentation.  She has been on feminizing hormones for several years.  She states that 2 years ago she underwent augmentation mammoplasty by Dr. Brayan Parham.  She is concerned that her breasts appear "too projected."  Operative note demonstrates that 215 cc smooth round moderate plus profile silicone Sientra implants had been placed.  The patient states now that although her breasts are "cute," she does not feel like they "fit with [her] body."  She states she is not interested in returning to her original surgeon.\par \par She works in retail.  She lives with 2 roommates, who have lived with her for several years and "are not going anywhere."  She states they will assist her after surgery.  She denies nicotine, alcohol, and other recreational drug use.

## 2022-08-07 NOTE — REVIEW OF SYSTEMS
[Eyesight Problems] : eyesight problems [Dry Eyes] : dryness of the eyes [As Noted in HPI] : as noted in HPI [Negative] : Heme/Lymph

## 2022-08-07 NOTE — ASSESSMENT
[FreeTextEntry1] : The patient is interested in revision breast augmentation with lower profile implants.  We reviewed that insurance may not cover this procedure as she does not have any clear evidence of capsular contracture or other implant complication.  It is too early for recommended ultrasound or MRI imaging of the implants.\par \par She will need 2 letters of assessment and support of revision breast augmentation.  She will need to return for discussion of the patient decision checklist.

## 2022-12-09 ENCOUNTER — APPOINTMENT (OUTPATIENT)
Dept: PLASTIC SURGERY | Facility: CLINIC | Age: 27
End: 2022-12-09

## 2022-12-09 VITALS
SYSTOLIC BLOOD PRESSURE: 115 MMHG | OXYGEN SATURATION: 95 % | HEIGHT: 70 IN | DIASTOLIC BLOOD PRESSURE: 81 MMHG | HEART RATE: 96 BPM | TEMPERATURE: 97.7 F

## 2022-12-09 DIAGNOSIS — F64.9 GENDER IDENTITY DISORDER, UNSPECIFIED: ICD-10-CM

## 2022-12-09 DIAGNOSIS — Z09 ENCOUNTER FOR FOLLOW-UP EXAMINATION AFTER COMPLETED TREATMENT FOR CONDITIONS OTHER THAN MALIGNANT NEOPLASM: ICD-10-CM

## 2022-12-09 PROCEDURE — 99072 ADDL SUPL MATRL&STAF TM PHE: CPT

## 2022-12-09 PROCEDURE — 99213 OFFICE O/P EST LOW 20 MIN: CPT

## 2024-02-16 ENCOUNTER — APPOINTMENT (OUTPATIENT)
Dept: PLASTIC SURGERY | Facility: CLINIC | Age: 29
End: 2024-02-16

## 2024-09-03 NOTE — BRIEF OPERATIVE NOTE - NSICDXBRIEFOPLAUNCH_GEN_ALL_CORE
<--- Click to Launch ICDx for PreOp, PostOp and Procedure <-- Click to add NO significant Past Surgical History

## 2024-10-15 ENCOUNTER — TRANSCRIPTION ENCOUNTER (OUTPATIENT)
Age: 29
End: 2024-10-15

## 2024-12-04 ENCOUNTER — APPOINTMENT (OUTPATIENT)
Dept: OPHTHALMOLOGY | Facility: CLINIC | Age: 29
End: 2024-12-04